# Patient Record
Sex: MALE | Race: WHITE | HISPANIC OR LATINO | Employment: FULL TIME | URBAN - METROPOLITAN AREA
[De-identification: names, ages, dates, MRNs, and addresses within clinical notes are randomized per-mention and may not be internally consistent; named-entity substitution may affect disease eponyms.]

---

## 2019-07-03 ENCOUNTER — HOSPITAL ENCOUNTER (EMERGENCY)
Facility: HOSPITAL | Age: 24
Discharge: HOME | End: 2019-07-04
Attending: EMERGENCY MEDICINE
Payer: MEDICAID

## 2019-07-03 DIAGNOSIS — N20.1 URETEROLITHIASIS: Primary | ICD-10-CM

## 2019-07-03 LAB
ALBUMIN SERPL BCP-MCNC: 4.5 G/DL (ref 3.5–5.2)
ALP SERPL-CCNC: 64 U/L (ref 55–135)
ALT SERPL W/O P-5'-P-CCNC: 13 U/L (ref 10–44)
ANION GAP SERPL CALC-SCNC: 13 MMOL/L (ref 8–16)
AST SERPL-CCNC: 25 U/L (ref 10–40)
BASOPHILS # BLD AUTO: 0.03 K/UL (ref 0–0.2)
BASOPHILS NFR BLD: 0.3 % (ref 0–1.9)
BILIRUB SERPL-MCNC: 0.6 MG/DL (ref 0.1–1)
BILIRUB UR QL STRIP: NEGATIVE
BUN SERPL-MCNC: 19 MG/DL (ref 6–20)
CALCIUM SERPL-MCNC: 10.1 MG/DL (ref 8.7–10.5)
CHLORIDE SERPL-SCNC: 101 MMOL/L (ref 95–110)
CLARITY UR: CLEAR
CO2 SERPL-SCNC: 24 MMOL/L (ref 23–29)
COLOR UR: YELLOW
CREAT SERPL-MCNC: 1 MG/DL (ref 0.5–1.4)
DIFFERENTIAL METHOD: ABNORMAL
EOSINOPHIL # BLD AUTO: 0.1 K/UL (ref 0–0.5)
EOSINOPHIL NFR BLD: 0.7 % (ref 0–8)
ERYTHROCYTE [DISTWIDTH] IN BLOOD BY AUTOMATED COUNT: 12.1 % (ref 11.5–14.5)
EST. GFR  (AFRICAN AMERICAN): >60 ML/MIN/1.73 M^2
EST. GFR  (NON AFRICAN AMERICAN): >60 ML/MIN/1.73 M^2
GLUCOSE SERPL-MCNC: 120 MG/DL (ref 70–110)
GLUCOSE UR QL STRIP: NEGATIVE
HCT VFR BLD AUTO: 45.8 % (ref 40–54)
HGB BLD-MCNC: 15.6 G/DL (ref 14–18)
HGB UR QL STRIP: ABNORMAL
KETONES UR QL STRIP: ABNORMAL
LEUKOCYTE ESTERASE UR QL STRIP: NEGATIVE
LIPASE SERPL-CCNC: 8 U/L (ref 4–60)
LYMPHOCYTES # BLD AUTO: 1.5 K/UL (ref 1–4.8)
LYMPHOCYTES NFR BLD: 13.7 % (ref 18–48)
MCH RBC QN AUTO: 29.7 PG (ref 27–31)
MCHC RBC AUTO-ENTMCNC: 34.1 G/DL (ref 32–36)
MCV RBC AUTO: 87 FL (ref 82–98)
MICROSCOPIC COMMENT: ABNORMAL
MONOCYTES # BLD AUTO: 0.9 K/UL (ref 0.3–1)
MONOCYTES NFR BLD: 8.6 % (ref 4–15)
NEUTROPHILS # BLD AUTO: 8.4 K/UL (ref 1.8–7.7)
NEUTROPHILS NFR BLD: 76.7 % (ref 38–73)
NITRITE UR QL STRIP: NEGATIVE
PH UR STRIP: 7 [PH] (ref 5–8)
PLATELET # BLD AUTO: 225 K/UL (ref 150–350)
PMV BLD AUTO: 8.7 FL (ref 9.2–12.9)
POTASSIUM SERPL-SCNC: 3.7 MMOL/L (ref 3.5–5.1)
PROT SERPL-MCNC: 7.5 G/DL (ref 6–8.4)
PROT UR QL STRIP: ABNORMAL
RBC # BLD AUTO: 5.25 M/UL (ref 4.6–6.2)
RBC #/AREA URNS HPF: 10 /HPF (ref 0–4)
SODIUM SERPL-SCNC: 138 MMOL/L (ref 136–145)
SP GR UR STRIP: 1.01 (ref 1–1.03)
URN SPEC COLLECT METH UR: ABNORMAL
UROBILINOGEN UR STRIP-ACNC: NEGATIVE EU/DL
WBC # BLD AUTO: 10.96 K/UL (ref 3.9–12.7)
WBC #/AREA URNS HPF: 3 /HPF (ref 0–5)

## 2019-07-03 PROCEDURE — 96376 TX/PRO/DX INJ SAME DRUG ADON: CPT

## 2019-07-03 PROCEDURE — 99284 EMERGENCY DEPT VISIT MOD MDM: CPT | Mod: 25

## 2019-07-03 PROCEDURE — 96361 HYDRATE IV INFUSION ADD-ON: CPT

## 2019-07-03 PROCEDURE — 85025 COMPLETE CBC W/AUTO DIFF WBC: CPT

## 2019-07-03 PROCEDURE — 80053 COMPREHEN METABOLIC PANEL: CPT

## 2019-07-03 PROCEDURE — 96374 THER/PROPH/DIAG INJ IV PUSH: CPT

## 2019-07-03 PROCEDURE — 81000 URINALYSIS NONAUTO W/SCOPE: CPT

## 2019-07-03 PROCEDURE — 96375 TX/PRO/DX INJ NEW DRUG ADDON: CPT

## 2019-07-03 PROCEDURE — 63600175 PHARM REV CODE 636 W HCPCS: Performed by: EMERGENCY MEDICINE

## 2019-07-03 PROCEDURE — 83690 ASSAY OF LIPASE: CPT

## 2019-07-03 PROCEDURE — 25000003 PHARM REV CODE 250: Performed by: EMERGENCY MEDICINE

## 2019-07-03 RX ORDER — ONDANSETRON 2 MG/ML
4 INJECTION INTRAMUSCULAR; INTRAVENOUS
Status: COMPLETED | OUTPATIENT
Start: 2019-07-03 | End: 2019-07-03

## 2019-07-03 RX ORDER — KETOROLAC TROMETHAMINE 30 MG/ML
15 INJECTION, SOLUTION INTRAMUSCULAR; INTRAVENOUS
Status: COMPLETED | OUTPATIENT
Start: 2019-07-03 | End: 2019-07-03

## 2019-07-03 RX ORDER — MORPHINE SULFATE 4 MG/ML
4 INJECTION, SOLUTION INTRAMUSCULAR; INTRAVENOUS
Status: COMPLETED | OUTPATIENT
Start: 2019-07-03 | End: 2019-07-03

## 2019-07-03 RX ORDER — SODIUM CHLORIDE 9 MG/ML
1000 INJECTION, SOLUTION INTRAVENOUS
Status: COMPLETED | OUTPATIENT
Start: 2019-07-03 | End: 2019-07-04

## 2019-07-03 RX ORDER — SODIUM CHLORIDE 9 MG/ML
1000 INJECTION, SOLUTION INTRAVENOUS
Status: COMPLETED | OUTPATIENT
Start: 2019-07-03 | End: 2019-07-03

## 2019-07-03 RX ADMIN — ONDANSETRON 4 MG: 2 INJECTION INTRAMUSCULAR; INTRAVENOUS at 08:07

## 2019-07-03 RX ADMIN — MORPHINE SULFATE 4 MG: 4 INJECTION INTRAVENOUS at 09:07

## 2019-07-03 RX ADMIN — MORPHINE SULFATE 4 MG: 4 INJECTION INTRAVENOUS at 11:07

## 2019-07-03 RX ADMIN — SODIUM CHLORIDE 1000 ML: 0.9 INJECTION, SOLUTION INTRAVENOUS at 10:07

## 2019-07-03 RX ADMIN — KETOROLAC TROMETHAMINE 15 MG: 30 INJECTION, SOLUTION INTRAMUSCULAR at 08:07

## 2019-07-03 RX ADMIN — SODIUM CHLORIDE 1000 ML: 0.9 INJECTION, SOLUTION INTRAVENOUS at 09:07

## 2019-07-04 VITALS
RESPIRATION RATE: 14 BRPM | HEIGHT: 69 IN | HEART RATE: 68 BPM | SYSTOLIC BLOOD PRESSURE: 120 MMHG | BODY MASS INDEX: 26.66 KG/M2 | OXYGEN SATURATION: 96 % | DIASTOLIC BLOOD PRESSURE: 64 MMHG | WEIGHT: 180 LBS | TEMPERATURE: 98 F

## 2019-07-04 RX ORDER — HYDROCODONE BITARTRATE AND ACETAMINOPHEN 5; 325 MG/1; MG/1
1 TABLET ORAL EVERY 4 HOURS PRN
Qty: 18 TABLET | Refills: 0 | OUTPATIENT
Start: 2019-07-04 | End: 2019-07-18

## 2019-07-04 RX ORDER — IBUPROFEN 600 MG/1
600 TABLET ORAL EVERY 6 HOURS PRN
Qty: 20 TABLET | Refills: 0 | Status: SHIPPED | OUTPATIENT
Start: 2019-07-04

## 2019-07-04 RX ORDER — ONDANSETRON 4 MG/1
4 TABLET, FILM COATED ORAL EVERY 6 HOURS
Qty: 12 TABLET | Refills: 0 | Status: SHIPPED | OUTPATIENT
Start: 2019-07-04

## 2019-07-04 RX ORDER — TAMSULOSIN HYDROCHLORIDE 0.4 MG/1
0.4 CAPSULE ORAL DAILY
Qty: 10 CAPSULE | Refills: 0 | Status: SHIPPED | OUTPATIENT
Start: 2019-07-04 | End: 2020-07-03

## 2019-07-04 NOTE — ED NOTES
Dr. Lefort notified that patient's pain has returned at this time. States he will place orders for medication.

## 2019-07-04 NOTE — ED NOTES
To ER room 24 via EMS from home with c/o abd pain around umbilicus with N/V that started today; BP cuff and pulse ox applied; no acute distress noted; will monitor closely.

## 2019-07-04 NOTE — ED NOTES
Dr. Lefort notified of Urine results at this time; verbal order received to change order to renal stone study.

## 2019-07-04 NOTE — ED NOTES
Pt continues to c/o pain around umbilicus; Dr. Leforte notified and verbal order given for Morphine 4mg IV x 1 dose; verbal order read back and verified; will administer as ordered

## 2019-07-04 NOTE — ED PROVIDER NOTES
Encounter Date: 7/3/2019    SCRIBE #1 NOTE: I, Leatha Summers, am scribing for, and in the presence of,  Dr. Lefort. I have scribed the entire note.       History     Chief Complaint   Patient presents with    Abdominal Pain     To ER with c/o umilical abdominal pain starting at 0530 this am.  Pt reports vomiting this afternoon.    Vomiting     A 24 year-old male presents to the ED for abdominal pain starting after eating breakfast this morning. Patient receives [employer] weekly. He reportedly ate 50% of pasta last night for dinner; remembers placing it back into fridge. He woke this morning and ate remainder of pasta with additional breakfast plate. Abdominal pain started about 30 minutes after meal. He describes generalized abdominal pain that waxes and wanes in severity, but now associated with persistent N/V. Symptoms exacerbated upon exertion. He reports that he is unable to tolerate anything PO without subsequent episode of vomiting. He denies fever, chills, diarrhea, constipation, dysuria, hematuria, flank pain, or any other associated symptoms. No significant PMHx or PSHx, No history of similar symptoms despite eating [employer]ly meals for the last 3 months.     The history is provided by the patient.     Review of patient's allergies indicates:  No Known Allergies  No past medical history on file.  No past surgical history on file.  No family history on file.  Social History     Tobacco Use    Smoking status: Not on file   Substance Use Topics    Alcohol use: Not on file    Drug use: Not on file     Review of Systems   Constitutional: Negative for chills and fever.   HENT: Negative for congestion, ear pain, rhinorrhea and sore throat.    Respiratory: Negative for cough and shortness of breath.    Cardiovascular: Negative for chest pain.   Gastrointestinal: Positive for abdominal pain, nausea and vomiting. Negative for diarrhea.   Genitourinary: Negative for dysuria and hematuria.   Musculoskeletal: Negative  for myalgias and neck pain.   Skin: Negative for rash.   Neurological: Negative for dizziness, weakness, light-headedness and headaches.   Psychiatric/Behavioral: Negative for confusion.       Physical Exam     Initial Vitals [07/03/19 2012]   BP Pulse Resp Temp SpO2   (!) 160/96 (!) 57 18 98 °F (36.7 °C) 99 %      MAP       --         Physical Exam    Nursing note and vitals reviewed.  Constitutional: He appears well-developed and well-nourished. No distress.   HENT:   Head: Normocephalic and atraumatic.   Mouth/Throat: Oropharynx is clear and moist.   Eyes: Conjunctivae and EOM are normal.   Cardiovascular: Normal rate, regular rhythm, normal heart sounds and intact distal pulses.   Pulmonary/Chest: Breath sounds normal. No respiratory distress.   Abdominal: Soft. He exhibits no distension. There is tenderness.   Lower abdominal tenderness with deep palpation   Musculoskeletal: Normal range of motion. He exhibits no edema or tenderness.   Neurological: He is alert and oriented to person, place, and time. He has normal strength.   Skin: Skin is warm and dry.         ED Course   Procedures  Labs Reviewed   CBC W/ AUTO DIFFERENTIAL - Abnormal; Notable for the following components:       Result Value    MPV 8.7 (*)     Gran # (ANC) 8.4 (*)     Gran% 76.7 (*)     Lymph% 13.7 (*)     All other components within normal limits   COMPREHENSIVE METABOLIC PANEL - Abnormal; Notable for the following components:    Glucose 120 (*)     All other components within normal limits   URINALYSIS, REFLEX TO URINE CULTURE - Abnormal; Notable for the following components:    Protein, UA Trace (*)     Ketones, UA 1+ (*)     Occult Blood UA 3+ (*)     All other components within normal limits    Narrative:     Preferred Collection Type->Urine, Clean Catch   URINALYSIS MICROSCOPIC - Abnormal; Notable for the following components:    RBC, UA 10 (*)     All other components within normal limits    Narrative:     Preferred Collection  Type->Urine, Clean Catch   LIPASE          Imaging Results          CT Renal Stone Study ABD Pelvis WO (Final result)  Result time 07/04/19 00:31:17    Final result by Belen Adams MD (07/04/19 00:31:17)                 Impression:      1. Small 2 mm stone at the left UVJ resulting in minimal left-sided hydronephrosis.  2. Additional small nonobstructing stone seen within each kidney.      Electronically signed by: Belen Adams MD  Date:    07/04/2019  Time:    00:31             Narrative:    EXAMINATION:  CT RENAL STONE STUDY ABD PELVIS WO    CLINICAL HISTORY:  Hematuria;    TECHNIQUE:  Low dose axial images, sagittal and coronal reformations were obtained from the lung bases to the pubic symphysis.  Contrast was not administered.    COMPARISON:  None    FINDINGS:  The visualized portion of the heart is unremarkable.  The lung bases are clear.    No significant hepatic abnormality seen on this noncontrast exam.  There is no intra-or extrahepatic biliary ductal dilatation.  The gallbladder is unremarkable.  The stomach, pancreas, spleen, and adrenal glands are unremarkable.    Small 2 mm stone is visualized at the left UVJ resulting in minimal left-sided hydronephrosis.  There is mild left perinephric stranding.  Additional punctate nonobstructing stone is seen within the left kidney.  Small nonobstructing stone is also visualized within the right kidney.  No abnormalities are seen along the right ureteral course.  No evidence of right-sided hydronephrosis.  Urinary bladder and prostate are unremarkable.    Appendix is visualized and is unremarkable.  The visualized loops of small and large bowel show no evidence of obstruction or inflammation.  No free air or free fluid.    Aorta tapers normally.    No acute osseous abnormality identified. Subcutaneous soft tissue structures are unremarkable.                                 Medical Decision Making:   Initial Assessment:   Uncomfortable and  diaphoretic  Differential Diagnosis:   Pyelonephritis ureterolithiasis UTI appendicitis gastroenteritis torsion  Independently Interpreted Test(s):   I have ordered and independently interpreted EKG Reading(s) - see prior notes  Clinical Tests:   Lab Tests: Ordered and Reviewed  Radiological Study: Ordered and Reviewed  ED Management:  Required several doses of pain medicine to control pain, however pain is much improved.  Given intensity of patient's symptoms and lack of prior symptoms in conjunction with hematuria, imaging obtained which was remarkable for 2 mm nonobstructing stone which appears to be responsible for patient's symptoms.  His workup is otherwise unremarkable.  We discussed urology follow-up, proper use of pain medication, expected course of illness, indications for ED return.                      Clinical Impression:       ICD-10-CM ICD-9-CM   1. Ureterolithiasis N20.1 592.1       Disposition:   Disposition: Discharged  Condition: Stable       Scribe Attestation I, Dr. Guy Lefort, personally performed the services described in this documentation. All medical record entries made by the scribe were at my direction and in my presence. I have reviewed the chart and agree that the record reflects my personal performance and is accurate and complete. Guy Lefort, MD.  12:37 AM 07/04/2019                    Guy J. Lefort, MD  07/04/19 0039

## 2019-07-04 NOTE — ED NOTES
Patient provided with strainer at this time and an ambulatory referral to f/u with urology. Patient educated on discharge prescriptions; patient verbalized understanding at this time.

## 2019-07-18 ENCOUNTER — HOSPITAL ENCOUNTER (EMERGENCY)
Facility: HOSPITAL | Age: 24
Discharge: HOME | End: 2019-07-18
Attending: EMERGENCY MEDICINE
Payer: MEDICAID

## 2019-07-18 VITALS
BODY MASS INDEX: 26.07 KG/M2 | DIASTOLIC BLOOD PRESSURE: 79 MMHG | OXYGEN SATURATION: 99 % | HEIGHT: 69 IN | WEIGHT: 176 LBS | HEART RATE: 81 BPM | TEMPERATURE: 99 F | SYSTOLIC BLOOD PRESSURE: 137 MMHG | RESPIRATION RATE: 20 BRPM

## 2019-07-18 DIAGNOSIS — R59.0 ANTERIOR CERVICAL LYMPHADENOPATHY: Primary | ICD-10-CM

## 2019-07-18 PROCEDURE — 99281 EMR DPT VST MAYX REQ PHY/QHP: CPT

## 2019-07-18 NOTE — ED NOTES
APPEARANCE: Alert, oriented and in no acute distress.  CARDIAC: Normal rate and rhythm, no murmur heard.   PERIPHERAL VASCULAR: peripheral pulses present. Normal cap refill. No edema. Warm to touch.    RESPIRATORY:Normal rate and effort, breath sounds clear bilaterally throughout chest. Respirations are equal and unlabored no obvious signs of distress.  GASTRO: soft, bowel sounds normal, no tenderness, no abdominal distention.  MUSC: Full ROM. No bony tenderness or soft tissue tenderness. No obvious deformity.  SKIN: Skin is warm and dry, normal skin turgor, mucous membranes moist.Swollen lymph nodes , submental and cervical.  NEURO: 5/5 strength major flexors/extensors bilaterally. Sensory intact to light touch bilaterally. [place] coma scale: eyes open spontaneously-4, oriented & converses-5, obeys commands-6. No neurological abnormalities.   MENTAL STATUS: awake, alert and aware of environment.  EYE: PERRL, both eyes: pupils brisk and reactive to light. Normal size.  ENT: EARS: no obvious drainage. NOSE: no active bleeding.

## 2019-07-18 NOTE — ED PROVIDER NOTES
Encounter Date: 7/18/2019    SCRIBE #1 NOTE: I, Oriana Ferris, am scribing for, and in the presence of,  Dr. Parker. I have scribed the entire note.       History     Chief Complaint   Patient presents with    Facial Swelling     Pt has progressive swelling to bilateral sides of neck, worse to L side with tenderness x3 days.      This is a 24 y.o. male who has no past medical history on file.     The patient presents to the Emergency Department due to neck swelling starting three days ago.   He reports mild swelling on the L side of his neck, which worsened, and now he has noticed swelling to the R side as well.  He endorses mild associated sore throat, but denies any fever, N/V, headache, ear pain, posterior neck pain/stiffness, cough/congestion, or any other complaints.  He denies any known sick contacts or recent illness.    The history is provided by the patient.     Review of patient's allergies indicates:  No Known Allergies  History reviewed. No pertinent past medical history.  History reviewed. No pertinent surgical history.  History reviewed. No pertinent family history.  Social History     Tobacco Use    Smoking status: Never Smoker   Substance Use Topics    Alcohol use: Not Currently     Frequency: Never    Drug use: Never     Review of Systems   Constitutional: Negative for chills and fever.   HENT: Positive for sore throat. Negative for congestion, ear pain, facial swelling and rhinorrhea.    Respiratory: Negative for cough, shortness of breath and wheezing.    Cardiovascular: Negative for chest pain and palpitations.   Gastrointestinal: Negative for abdominal pain, diarrhea, nausea and vomiting.   Genitourinary: Negative for dysuria and hematuria.   Musculoskeletal: Positive for neck pain. Negative for back pain and myalgias.   Skin: Negative for rash.   Neurological: Negative for dizziness, weakness, light-headedness and headaches.   Hematological: Positive for adenopathy.    Psychiatric/Behavioral: Negative for confusion.       Physical Exam     Initial Vitals [07/18/19 0655]   BP Pulse Resp Temp SpO2   137/79 81 20 98.6 °F (37 °C) 99 %      MAP       --         Physical Exam    Nursing note and vitals reviewed.  Constitutional: He appears well-developed and well-nourished. He is not diaphoretic. No distress.   Well-appearing, NAD.   HENT:   Head: Normocephalic and atraumatic. Head is without contusion and without laceration.   Right Ear: Tympanic membrane, external ear and ear canal normal. No middle ear effusion.   Left Ear: Tympanic membrane, external ear and ear canal normal.  No middle ear effusion.   Mouth/Throat: Uvula is midline, oropharynx is clear and moist and mucous membranes are normal. No oropharyngeal exudate, posterior oropharyngeal edema, posterior oropharyngeal erythema or tonsillar abscesses.   Eyes: EOM are normal. Pupils are equal, round, and reactive to light.   Neck: Trachea normal, normal range of motion and full passive range of motion without pain. Neck supple. No spinous process tenderness and no muscular tenderness present. No tracheal deviation present. No neck rigidity.   Cardiovascular: Normal rate, regular rhythm, normal heart sounds and intact distal pulses.   Pulmonary/Chest: Breath sounds normal. No stridor. No respiratory distress.   Abdominal: Soft. He exhibits no distension and no mass. There is no tenderness.   Musculoskeletal: Normal range of motion. He exhibits no edema.   Lymphadenopathy:        Head (right side): Submandibular adenopathy present.        Head (left side): Submandibular adenopathy present.     He has cervical adenopathy.        Right cervical: Superficial cervical adenopathy present.        Left cervical: Superficial cervical adenopathy present.   Neurological: He is alert and oriented to person, place, and time. No cranial nerve deficit or sensory deficit.   Skin: Skin is warm and dry. Capillary refill takes less than 2  seconds. No rash noted.   Psychiatric: He has a normal mood and affect. His behavior is normal. Thought content normal.         ED Course   Procedures  Labs Reviewed - No data to display       Imaging Results    None          Medical Decision Making:   Differential Diagnosis:   Differential Diagnosis includes, but is not limited to:  Rubio's angina, epiglottitis, foreign body aspiration, retropharyngeal abscess, peritonsillar abscess, esophageal perforation, mediastinitis, esophagitis, sialolithiasis, parotitis, laryngitis, tracheitis, dental/periapical abscess, TMJ disorder, pneumonia, Streptococcal/bacterial pharyngitis, viral pharyngitis.    Upon re-evaluation, the patient's status has improved.  After complete ED evaluation, clinical impression is most consistent with lymphadenopathy.  PCP follow-up within 2-3 days was recommended.    After taking into careful account the patient's history, physical exam findings, as well as empirical and objective data obtained throughout ED workup, I feel no emergent medical condition has been identified. No further evaluation or admission was felt to be required, and the patient is stable for discharge from the ED. The patient and any additional family present were updated with test results, overall clinical impression, and recommended further plan of care, including discharge instructions as provided and outpatient follow-up for continued evaluation and management as needed. All questions were answered. The patient expressed understanding and agreed with current plan for discharge and follow-up plan of care. Strict ED return precautions were provided, including return/worsening of current symptoms, new symptoms, or any other concerns.                     ED Course as of Jul 20 0829   Th Jul 18, 2019   0718 24-year-old male previously healthy presents to ED due to painful lymph nodes starting 3 days ago.  He 1st noticed swelling to the left side of his neck, shortly  "followed by the right side.  Denies any improvement over the last few days.  He denies any associated fever, nausea/vomiting, headache, neck pain, back pain, abdominal pain, flank pain, or urinary complaints.  He does endorse a mild "scratchy" sore throat over the last few days.  He denies any known sick contacts.  Vitals unremarkable, exam benign.  No TM effusions, posterior oropharynx clear.  Likely reactive lymphadenopathy from possible viral pharyngitis.  Patient counseled on symptomatic and supportive care, Tylenol/ibuprofen for pain/fever, importance of follow-up with PCP to ensure improvement.  Patient stated understanding and stable for discharge.    [SS]      ED Course User Index  [SS] Sukumar Parker MD     Clinical Impression:       ICD-10-CM ICD-9-CM   1. Anterior cervical lymphadenopathy R59.0 785.6          Disposition:   Disposition: Discharged  Condition: Stable    I, Dr. Sukumar Parker, personally performed the services described in this documentation. All medical record entries made by the scribe were at my direction and in my presence.  I have reviewed the chart and agree that the record reflects my personal performance and is accurate and complete.     Sukumar Parker MD.         Sukumar Parker MD  07/20/19 0838    "

## 2022-08-30 ENCOUNTER — OCCUPATIONAL HEALTH (OUTPATIENT)
Dept: URGENT CARE | Facility: CLINIC | Age: 27
End: 2022-08-30

## 2022-08-30 DIAGNOSIS — Z00.00 ENCOUNTER FOR PHYSICAL EXAMINATION: Primary | ICD-10-CM

## 2022-08-30 LAB — POC BREATH ALCOHOL: NEGATIVE

## 2022-08-30 PROCEDURE — 82075 ASSAY OF BREATH ETHANOL: CPT | Mod: S$GLB,,, | Performed by: EMERGENCY MEDICINE

## 2022-08-30 PROCEDURE — 99499 UNLISTED E&M SERVICE: CPT | Mod: S$GLB,,, | Performed by: EMERGENCY MEDICINE

## 2022-08-30 PROCEDURE — 92552 PURE TONE AUDIOMETRY AIR: CPT | Mod: S$GLB,,, | Performed by: EMERGENCY MEDICINE

## 2022-08-30 PROCEDURE — 80305 DRUG TEST PRSMV DIR OPT OBS: CPT | Mod: S$GLB,,, | Performed by: EMERGENCY MEDICINE

## 2022-08-30 PROCEDURE — 99499 PHYSICAL, BASIC COMPLEXITY: ICD-10-PCS | Mod: S$GLB,,, | Performed by: EMERGENCY MEDICINE

## 2022-08-30 PROCEDURE — 92552 AUDIOGRAM OCC MED: ICD-10-PCS | Mod: S$GLB,,, | Performed by: EMERGENCY MEDICINE

## 2022-08-30 PROCEDURE — 80305 OOH NON-DOT DRUG SCREEN: ICD-10-PCS | Mod: S$GLB,,, | Performed by: EMERGENCY MEDICINE

## 2022-08-30 PROCEDURE — 82075 POCT ALCOHOL BREATH TEST: ICD-10-PCS | Mod: S$GLB,,, | Performed by: EMERGENCY MEDICINE

## 2022-09-02 ENCOUNTER — OCCUPATIONAL HEALTH (OUTPATIENT)
Dept: URGENT CARE | Facility: CLINIC | Age: 27
End: 2022-09-02

## 2022-09-02 DIAGNOSIS — Z02.83 ENCOUNTER FOR DRUG SCREENING: Primary | ICD-10-CM
